# Patient Record
Sex: FEMALE | Race: WHITE | NOT HISPANIC OR LATINO | Employment: OTHER | URBAN - METROPOLITAN AREA
[De-identification: names, ages, dates, MRNs, and addresses within clinical notes are randomized per-mention and may not be internally consistent; named-entity substitution may affect disease eponyms.]

---

## 2024-11-21 ENCOUNTER — APPOINTMENT (OUTPATIENT)
Dept: GENERAL RADIOLOGY | Facility: HOSPITAL | Age: 59
End: 2024-11-21
Payer: MEDICAID

## 2024-11-21 ENCOUNTER — HOSPITAL ENCOUNTER (EMERGENCY)
Facility: HOSPITAL | Age: 59
Discharge: HOME | End: 2024-11-21
Attending: EMERGENCY MEDICINE
Payer: MEDICAID

## 2024-11-21 VITALS
RESPIRATION RATE: 18 BRPM | TEMPERATURE: 98 F | HEIGHT: 63 IN | DIASTOLIC BLOOD PRESSURE: 86 MMHG | OXYGEN SATURATION: 98 % | SYSTOLIC BLOOD PRESSURE: 126 MMHG | HEART RATE: 88 BPM

## 2024-11-21 DIAGNOSIS — M79.642 LEFT HAND PAIN: Primary | ICD-10-CM

## 2024-11-21 DIAGNOSIS — M19.042 ARTHRITIS OF LEFT HAND: ICD-10-CM

## 2024-11-21 PROCEDURE — 73120 X-RAY EXAM OF HAND: CPT

## 2024-11-21 PROCEDURE — 99283 EMERGENCY DEPT VISIT LOW MDM: CPT

## 2024-11-21 RX ORDER — IBUPROFEN 400 MG/1
800 TABLET, FILM COATED ORAL ONCE
Status: COMPLETED | OUTPATIENT
Start: 2024-11-21 | End: 2024-11-21

## 2024-11-21 RX ADMIN — IBUPROFEN 800 MG: 400 TABLET, FILM COATED ORAL at 15:50

## 2024-11-21 NOTE — DISCHARGE INSTRUCTIONS
You have a significant amount of arthritis to your left hand.  The fracture that you have had on the left fifth finger has healed but does have some angulation to it so please wear the brace as directed to improve pain, you may take the brace off at night.  Alternate Tylenol and ibuprofen as needed for pain control.  Follow-up with primary care provider if this does not improve your symptoms.

## 2024-11-21 NOTE — ED PROVIDER NOTES
"Time: 2:19 PM EST  Date of encounter:  11/21/2024  Independent Historian/Clinical History and Information was obtained by:   Patient    History is limited by: N/A    Chief Complaint: Hand pain      History of Present Illness:  Patient is a 59 y.o. year old female who presents to the emergency department for evaluation of hand pain.  Patient presents to the emergency department today for evaluation of left hand pain.  She states that many months ago she fractured her left fifth knuckle.  She states that she was arrested after that and has been in the [facility] and did not have a brace when she was arrested so has not been wearing it and is having continuing pain.  She states that the pain is radiating of her left arm as well.  Patient denies any new injury.  No other complaints.      Patient Care Team  Primary Care Provider: Geremias Matos APRN    Past Medical History:     No Known Allergies  No past medical history on file.  No past surgical history on file.  No family history on file.    Home Medications:  Prior to Admission medications    Not on File        Social History:          Review of Systems:  Review of Systems   Musculoskeletal:  Positive for arthralgias and joint swelling.        Physical Exam:  /87 (BP Location: Left arm, Patient Position: Sitting)   Pulse 98   Temp 98 °F (36.7 °C) (Oral)   Resp 18   Ht 160 cm (63\")   SpO2 100%     Physical Exam  Vitals and nursing note reviewed.   Constitutional:       Appearance: Normal appearance.   HENT:      Head: Normocephalic and atraumatic.      Nose: Nose normal.   Eyes:      Conjunctiva/sclera: Conjunctivae normal.   Cardiovascular:      Rate and Rhythm: Normal rate and regular rhythm.      Heart sounds: Normal heart sounds.   Pulmonary:      Effort: Pulmonary effort is normal.      Breath sounds: Normal breath sounds.   Musculoskeletal:         General: Normal range of motion.      Left hand: Tenderness and bony tenderness " present. No swelling or lacerations. Normal range of motion. Normal strength. Normal sensation. There is no disruption of two-point discrimination. Normal capillary refill. Normal pulse.      Cervical back: Normal range of motion and neck supple.      Comments: Arthritic deformities of left hand   Skin:     General: Skin is warm and dry.   Neurological:      General: No focal deficit present.      Mental Status: She is alert and oriented to person, place, and time.   Psychiatric:         Mood and Affect: Mood normal.         Behavior: Behavior normal.         Thought Content: Thought content normal.         Judgment: Judgment normal.                Procedures:  Procedures      Medical Decision Making:    Comorbidities that affect care:    None    External Notes reviewed:    Previous ED Note: Emergency department visit from 6/7-24 where patient was seen in the emergency department at [facility] and had boxer's fracture      The following orders were placed and all results were independently analyzed by me:  Orders Placed This Encounter   Procedures    XR Hand 2 View Left    Obtain & Apply The Following- Upper extremity; Wrist cock-up       Medications Given in the Emergency Department:  Medications - No data to display     ED Course:         Labs:    Lab Results (last 24 hours)       ** No results found for the last 24 hours. **             Imaging:    XR Hand 2 View Left    Result Date: 11/21/2024  XR HAND 2 VW LEFT Date of Exam: 11/21/2024 2:29 PM EST Indication: BONE PAIN, HAND L hand pain, 5th finger pain at [facility] pain Comparison: None available Findings: There is posterior dislocation or subluxation at the first digit metacarpal phalangeal joint. This is not the reported area of pain. Correlate clinically with chronicity. There is a old healed fracture deformity of the mid fifth metacarpal shaft with slight volar angulation. There is volar plate and screw fixation along the volar aspect of the  distal radius. There is degenerative joint space narrowing at the distal radioulnar joint and radiocarpal joint.     Impression: 1. Healed fracture deformity of the fifth metacarpal shaft with slight volar angulation. 2. Posterior dislocation or subluxation at the first digit metacarpal phalangeal joint. This is not at the reported area of pain by history. Correlate clinically for injury to the first digit. 3. Degenerative joint disease at the distal radial ulnar joint and radiocarpal joint. Electronically Signed: Freddy Coleman  11/21/2024 2:57 PM EST  Workstation ID: [id number]       Differential Diagnosis and Discussion:    Joint Pain: Differential diagnosis includes but is not limited to polyarticular arthritis, gout, tendinitis, hemarthrosis, septic arthritis, rheumatoid arthritis, bursitis, degenerative joint disease, joint effusion, autoimmune disorder, trauma, and occult neoplasm.    All X-rays impressions were independently interpreted by me.    MDM  Number of Diagnoses or Management Options  Diagnosis management comments: Patient presented to the emergency department today for evaluation of left hand pain.  Patient does have healed fracture that does still have some mild angulation and other arthritic changes of the left hand.  I will provide patient with wrist brace to help with pain and have her take Tylenol and ibuprofen at home as needed.  Patient does have history of substance abuse so no stronger pain will be ordered at this time.       Amount and/or Complexity of Data Reviewed  Tests in the radiology section of CPT®: reviewed and ordered    Risk of Complications, Morbidity, and/or Mortality  Presenting problems: moderate  Diagnostic procedures: low  Management options: low    Patient Progress  Patient progress: stable      Patient Care Considerations:    NARCOTICS: I considered prescribing opiate pain medication as an outpatient, however patient has history of substance abuse and there is no acute  fracture      Consultants/Shared Management Plan:    None    Social Determinants of Health:    Patient is independent, reliable, and has access to care.       Disposition and Care Coordination:    Discharged: The patient is suitable and stable for discharge with no need for consideration of admission.    I have explained the patient´s condition, diagnoses and treatment plan based on the information available to me at this time. I have answered questions and addressed any concerns. The patient has a good  understanding of the patient´s diagnosis, condition, and treatment plan as can be expected at this point. The vital signs have been stable. The patient´s condition is stable and appropriate for discharge from the emergency department.      The patient will pursue further outpatient evaluation with the primary care physician or other designated or consulting physician as outlined in the discharge instructions. They are agreeable to this plan of care and follow-up instructions have been explained in detail. The patient has received these instructions in written format and has expressed an understanding of the discharge instructions. The patient is aware that any significant change in condition or worsening of symptoms should prompt an immediate return to this or the closest emergency department or call to 911.  I have explained discharge medications and the need for follow up with the patient/caretakers. This was also printed in the discharge instructions. Patient was discharged with the following medications and follow up:      Medication List      No changes were made to your prescriptions during this visit.      Geremias Matos, APRN  [address] [facility] [address] [phone]             Final diagnoses:   Left hand pain   Arthritis of left hand        ED Disposition       ED Disposition   Discharge    Condition   Stable    Comment   --               This medical record created using voice  recognition software.             Dilip Lyn PA-C  11/21/24 [id number]